# Patient Record
Sex: FEMALE | Race: WHITE | NOT HISPANIC OR LATINO | Employment: UNEMPLOYED | URBAN - METROPOLITAN AREA
[De-identification: names, ages, dates, MRNs, and addresses within clinical notes are randomized per-mention and may not be internally consistent; named-entity substitution may affect disease eponyms.]

---

## 2020-01-16 ENCOUNTER — APPOINTMENT (OUTPATIENT)
Dept: RADIOLOGY | Facility: MEDICAL CENTER | Age: 50
End: 2020-01-16
Attending: EMERGENCY MEDICINE
Payer: COMMERCIAL

## 2020-01-16 ENCOUNTER — HOSPITAL ENCOUNTER (EMERGENCY)
Facility: MEDICAL CENTER | Age: 50
End: 2020-01-16
Attending: EMERGENCY MEDICINE
Payer: COMMERCIAL

## 2020-01-16 VITALS
DIASTOLIC BLOOD PRESSURE: 52 MMHG | HEART RATE: 83 BPM | SYSTOLIC BLOOD PRESSURE: 110 MMHG | WEIGHT: 158.29 LBS | HEIGHT: 63 IN | RESPIRATION RATE: 15 BRPM | BODY MASS INDEX: 28.05 KG/M2 | TEMPERATURE: 97 F | OXYGEN SATURATION: 96 %

## 2020-01-16 DIAGNOSIS — M54.50 ACUTE RIGHT-SIDED LOW BACK PAIN WITHOUT SCIATICA: ICD-10-CM

## 2020-01-16 LAB
APPEARANCE UR: ABNORMAL
BACTERIA #/AREA URNS HPF: ABNORMAL /HPF
BILIRUB UR QL STRIP.AUTO: NEGATIVE
CAOX CRY #/AREA URNS HPF: ABNORMAL /HPF
COLOR UR: YELLOW
EPI CELLS #/AREA URNS HPF: ABNORMAL /HPF
GLUCOSE UR STRIP.AUTO-MCNC: NEGATIVE MG/DL
HYALINE CASTS #/AREA URNS LPF: ABNORMAL /LPF
KETONES UR STRIP.AUTO-MCNC: ABNORMAL MG/DL
LEUKOCYTE ESTERASE UR QL STRIP.AUTO: NEGATIVE
MICRO URNS: ABNORMAL
NITRITE UR QL STRIP.AUTO: NEGATIVE
PH UR STRIP.AUTO: 5.5 [PH] (ref 5–8)
PROT UR QL STRIP: NEGATIVE MG/DL
RBC # URNS HPF: ABNORMAL /HPF
RBC UR QL AUTO: NEGATIVE
SP GR UR STRIP.AUTO: 1.03
UROBILINOGEN UR STRIP.AUTO-MCNC: 0.2 MG/DL
WBC #/AREA URNS HPF: ABNORMAL /HPF

## 2020-01-16 PROCEDURE — 74176 CT ABD & PELVIS W/O CONTRAST: CPT

## 2020-01-16 PROCEDURE — 81001 URINALYSIS AUTO W/SCOPE: CPT

## 2020-01-16 PROCEDURE — 96375 TX/PRO/DX INJ NEW DRUG ADDON: CPT

## 2020-01-16 PROCEDURE — A9270 NON-COVERED ITEM OR SERVICE: HCPCS | Performed by: EMERGENCY MEDICINE

## 2020-01-16 PROCEDURE — 700102 HCHG RX REV CODE 250 W/ 637 OVERRIDE(OP): Performed by: EMERGENCY MEDICINE

## 2020-01-16 PROCEDURE — 96374 THER/PROPH/DIAG INJ IV PUSH: CPT

## 2020-01-16 PROCEDURE — 700105 HCHG RX REV CODE 258: Performed by: EMERGENCY MEDICINE

## 2020-01-16 PROCEDURE — 700111 HCHG RX REV CODE 636 W/ 250 OVERRIDE (IP): Performed by: EMERGENCY MEDICINE

## 2020-01-16 PROCEDURE — 99284 EMERGENCY DEPT VISIT MOD MDM: CPT

## 2020-01-16 RX ORDER — SODIUM CHLORIDE 9 MG/ML
1000 INJECTION, SOLUTION INTRAVENOUS ONCE
Status: COMPLETED | OUTPATIENT
Start: 2020-01-16 | End: 2020-01-16

## 2020-01-16 RX ORDER — OXYCODONE HYDROCHLORIDE AND ACETAMINOPHEN 5; 325 MG/1; MG/1
1 TABLET ORAL ONCE
Status: COMPLETED | OUTPATIENT
Start: 2020-01-16 | End: 2020-01-16

## 2020-01-16 RX ORDER — KETOROLAC TROMETHAMINE 30 MG/ML
30 INJECTION, SOLUTION INTRAMUSCULAR; INTRAVENOUS ONCE
Status: COMPLETED | OUTPATIENT
Start: 2020-01-16 | End: 2020-01-16

## 2020-01-16 RX ORDER — NAPROXEN 500 MG/1
500 TABLET ORAL 2 TIMES DAILY WITH MEALS
Qty: 20 TAB | Refills: 0 | Status: SHIPPED | OUTPATIENT
Start: 2020-01-16

## 2020-01-16 RX ORDER — CYCLOBENZAPRINE HCL 10 MG
10 TABLET ORAL 3 TIMES DAILY PRN
Qty: 20 TAB | Refills: 0 | Status: SHIPPED | OUTPATIENT
Start: 2020-01-16

## 2020-01-16 RX ORDER — ONDANSETRON 2 MG/ML
4 INJECTION INTRAMUSCULAR; INTRAVENOUS ONCE
Status: COMPLETED | OUTPATIENT
Start: 2020-01-16 | End: 2020-01-16

## 2020-01-16 RX ADMIN — ONDANSETRON 4 MG: 2 INJECTION INTRAMUSCULAR; INTRAVENOUS at 13:48

## 2020-01-16 RX ADMIN — OXYCODONE HYDROCHLORIDE AND ACETAMINOPHEN 1 TABLET: 5; 325 TABLET ORAL at 15:51

## 2020-01-16 RX ADMIN — SODIUM CHLORIDE 1000 ML: 9 INJECTION, SOLUTION INTRAVENOUS at 13:48

## 2020-01-16 RX ADMIN — KETOROLAC TROMETHAMINE 30 MG: 30 INJECTION, SOLUTION INTRAMUSCULAR at 13:48

## 2020-01-16 ASSESSMENT — LIFESTYLE VARIABLES: DO YOU DRINK ALCOHOL: NO

## 2020-01-16 NOTE — ED TRIAGE NOTES
Pt ambulated to triage with   Chief Complaint   Patient presents with   • Flank Pain     right side, started 3 days ago, pt unsure if it is her back or her kidney.  h/o kindey stone and also she has been lifting luggage.      Pt Informed regarding triage process and verbalized understanding to inform triage tech or RN for any changes in condition. Placed in lobby.

## 2020-01-16 NOTE — ED PROVIDER NOTES
ED Provider Note    CHIEF COMPLAINT  Chief Complaint   Patient presents with   • Flank Pain     right side, started 3 days ago, pt unsure if it is her back or her kidney.  h/o kindey stone and also she has been lifting luggage.       HPI  Katt Corbett is a 49 y.o. female who presents for evaluation of low back pain primarily on the right side that began 3 days ago.  History of kidney stones but states her something different about this.  Severe, constantly present and waxing and waning in severity, radiates to the right side of her abdomen.  She has no hematuria or dysuria.  The patient states that she suspect she either injured it lifting luggage or getting onto an airplane, she is visiting her daughter here is having a baby today.  No fever, she is nauseated but has not vomited, no chest pain or shortness of breath.  She has no other known medical problems and at this time offers no other specific complaints    REVIEW OF SYSTEMS  Negative for fever, rash, chest pain, dyspnea, vomiting, diarrhea, headache, focal weakness, focal numbness, focal tingling. All other systems are negative.     PAST MEDICAL HISTORY  Past Medical History:   Diagnosis Date   • Anxiety    • Depression    • Kidney stone        FAMILY HISTORY  History reviewed. No pertinent family history.    SOCIAL HISTORY  Social History     Tobacco Use   • Smoking status: Current Some Day Smoker     Packs/day: 0.00   • Smokeless tobacco: Never Used   Substance Use Topics   • Alcohol use: Not Currently   • Drug use: Yes     Comment: marijuana       SURGICAL HISTORY  History reviewed. No pertinent surgical history.    CURRENT MEDICATIONS  I personally reviewed the medication list in the charting documentation.     ALLERGIES  Allergies   Allergen Reactions   • Amitriptyline Shortness of Breath and Rash     Rash         MEDICAL RECORD  I have reviewed patient's medical record and pertinent results are listed above.      PHYSICAL EXAM  VITAL SIGNS: /50  "  Pulse 87   Temp 36.1 °C (97 °F) (Temporal)   Resp 16   Ht 1.6 m (5' 3\")   Wt 71.8 kg (158 lb 4.6 oz)   SpO2 97%   BMI 28.04 kg/m²    Constitutional: Laying on her left side in acute distress secondary to pain   HENT: Mucus membranes moist.    Eyes: No scleral icterus. Normal conjunctiva   Neck: Supple, comfortable, nonpainful range of motion.   Thorax & Lungs: Normal nonlabored respirations  Abdomen: Soft, with no tenderness, rebound nor guarding.  No mass or pulsatile mass appreciated.  Skin: Warm, dry. No rash appreciated  Extremities/Musculoskeletal: No asymmetric calf tenderness, erythema or edema.  No tenderness of the right flank region.  Neurologic: Alert & oriented. No focal deficits observed.   Psychiatric: Normal affect appropriate for the clinical situation.    DIAGNOSTIC STUDIES / PROCEDURES    LABS/EKGs  Results for orders placed or performed during the hospital encounter of 01/16/20   URINALYSIS,CULTURE IF INDICATED   Result Value Ref Range    Color Yellow     Character Cloudy (A)     Specific Gravity 1.027 <1.035    Ph 5.5 5.0 - 8.0    Glucose Negative Negative mg/dL    Ketones Trace (A) Negative mg/dL    Protein Negative Negative mg/dL    Bilirubin Negative Negative    Urobilinogen, Urine 0.2 Negative    Nitrite Negative Negative    Leukocyte Esterase Negative Negative    Occult Blood Negative Negative    Micro Urine Req Microscopic    URINE MICROSCOPIC (W/UA)   Result Value Ref Range    WBC 0-2 /hpf    RBC 0-2 /hpf    Bacteria Moderate (A) None /hpf    Epithelial Cells Moderate (A) /hpf    Ca Oxalate Crystal Many /hpf    Hyaline Cast 3-5 (A) /lpf        RADIOLOGY  CT-RENAL COLIC EVALUATION(A/P W/O)   Final Result      1.  Nonobstructing LEFT kidney stone.   2.  No ureteral stone or hydronephrosis.   3.  No secondary signs of acute appendicitis, however the appendix is not well visualized.               COURSE & MEDICAL DECISION MAKING  I have reviewed any medical record information, " laboratory studies and radiographic results as noted above.    Encounter Summary: This is a 49 y.o. female with right flank pain rating in the abdomen with a history ureterolithiasis, states it feels somewhat different than her usual stones, on exam she is quite uncomfortable appearing, has a benign abdomen and no tenderness of the flank, her urinalysis gives no indication of occult blood.  Will medicate with Toradol and Zofran and IV fluids and obtain a CT scan and reevaluate ------ CT scan reveals no acute abnormalities, no evidence of urolithiasis, the patient is feeling improved after the Toradol.  At this point I think she is stable and appropriate for discharge, all serious urgent emergent etiologies have been excluded including AAA, retroperitoneal hematoma as well as obstructive uropathy.  She will be prescribed naproxen and Flexeril, strict return instructions will be discussed      DISPOSITION: Discharged home in stable condition      FINAL IMPRESSION  1. Acute right-sided low back pain without sciatica           This dictation was created using voice recognition software. The accuracy of the dictation is limited to the abilities of the software. I expect there may be some errors of grammar and possibly content. The nursing notes were reviewed and certain aspects of this information were incorporated into this note.    Electronically signed by: Arash Mishra M.D., 1/16/2020 1:40 PM